# Patient Record
(demographics unavailable — no encounter records)

---
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
## 2025-01-10 NOTE — DATA REVIEWED
[de-identified] : Hearing Test performed to evaluate the extent of hearing loss and  to explain pt's symptoms  was personally reviewed and revealed Tymps-wnl Hearing-left CHL

## 2025-01-10 NOTE — END OF VISIT
[FreeTextEntry3] : I personally saw and examined SONDRA GRAFF in detail.I have made changes in changes in the body of the note where appropriate. I personally reviewed the HPI, PMH, FH, SH, ROS and medications/allergies. I have spoken to AAMIR Rondon regarding the history and have personally determined the assessment and plan of care and documented this myself.. I performed all procedures and performed the physical exam. I have made changes in the body of the note where appropriate.   Attesting Faculty: See Attending Signature Below

## 2025-01-10 NOTE — PHYSICAL EXAM
[Midline] : trachea located in midline position [Normal] : no rashes [de-identified] : b/l post-operative changes.

## 2025-01-10 NOTE — CONSULT LETTER
[Dear  ___] : Dear  [unfilled], [Consult Letter:] : I had the pleasure of evaluating your patient, [unfilled]. [Please see my note below.] : Please see my note below. [Consult Closing:] : Thank you very much for allowing me to participate in the care of this patient.  If you have any questions, please do not hesitate to contact me. [Sincerely,] : Sincerely, [FreeTextEntry2] : Layla Lerma Address: [address] [place] [address] Phone: [phone] [FreeTextEntry3] :  Dany Fulton MD
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
## 2025-05-23 NOTE — PHYSICAL EXAM
[No Rash or Lesion] : No rash or lesion [Alert] : alert [Oriented to Person] : oriented to person [Oriented to Place] : oriented to place [Calm] : calm [de-identified] : comfortable, well appearing [de-identified] : no scleral icterus [de-identified] : breathing comfortably on room air; no cough  [de-identified] : soft, not tender and not distended previous abdominoplasty scars noted

## 2025-05-23 NOTE — PLAN
[FreeTextEntry1] : 54 year old woman with symptomatic cholelithiasis  - signs/symptoms of cholecystitis discussed with the patient. All questions answered. The patient is aware that if they develop the signs/symptoms of cholecystitis to go to the ED for possible urgent interventions.  - risks, benefits, and alternatives to laparoscopic cholecystectomy discussed with patient at length. All questions answered. - patient deferring surgery at this time; states she will consider; and call us if/when ready for surgery

## 2025-05-23 NOTE — HISTORY OF PRESENT ILLNESS
[de-identified] : 54 year old woman with a history of abdominoplasty, presents to the office to discuss her cholelithiasis.   Her gastroenterologist (Dr. Bright) told her years ago that she had cholelithiasis, and recommended her to see a general surgeon for consultation, however, she did not have significant discomfort, as such, deferred. More recently, she was in the [facility] ED with complaints of abdominal pain, during workup had US equivocal for cholecystitis, followed by HIDA which was negative for cholecystitis. She was discharged to home after the HIDA scan. She states during that episode, she had pain that started in her back and right upper quadrant associated with nausea and vomiting. She states she has not had the same pain since that episode.   Today in the office; she denies any nausea, vomiting, fever or chills. She is tolerating PO intake. Having normal GI function. Ambulating without issues. Not taking any pain medications.